# Patient Record
Sex: FEMALE | Race: WHITE | NOT HISPANIC OR LATINO | URBAN - NONMETROPOLITAN AREA
[De-identification: names, ages, dates, MRNs, and addresses within clinical notes are randomized per-mention and may not be internally consistent; named-entity substitution may affect disease eponyms.]

---

## 2023-04-12 ENCOUNTER — OFFICE (OUTPATIENT)
Dept: URBAN - NONMETROPOLITAN AREA CLINIC 5 | Facility: CLINIC | Age: 78
End: 2023-04-12
Payer: COMMERCIAL

## 2023-04-12 VITALS
RESPIRATION RATE: 20 BRPM | DIASTOLIC BLOOD PRESSURE: 98 MMHG | HEART RATE: 93 BPM | SYSTOLIC BLOOD PRESSURE: 122 MMHG | HEIGHT: 65 IN | WEIGHT: 153 LBS

## 2023-04-12 DIAGNOSIS — R19.7 DIARRHEA, UNSPECIFIED: ICD-10-CM

## 2023-04-12 DIAGNOSIS — R11.2 NAUSEA WITH VOMITING, UNSPECIFIED: ICD-10-CM

## 2023-04-12 DIAGNOSIS — R63.4 ABNORMAL WEIGHT LOSS: ICD-10-CM

## 2023-04-12 DIAGNOSIS — R15.2 FECAL URGENCY: ICD-10-CM

## 2023-04-12 PROCEDURE — 99204 OFFICE O/P NEW MOD 45 MIN: CPT | Performed by: INTERNAL MEDICINE

## 2023-04-12 NOTE — SERVICEHPINOTES
Ev Shaffer   is a   78 yo  female  with a past medical history of DM 2, HTN, HLD, and peripheral vascular disease who presents to establish care for diarrhea, nausea, vomiting, and weight loss. Patient reports that at the beginning of February she developed diarrhea suddenly. In February and March she was having diarrhea all day everyday. Now she is having 4-5 episodes of loose watery stools every morning and then is okay the remainder of the day. She does endorse nocturnal BMs, urgency with BMs, episodes of incontinence, and a 10 lb weight loss. She denies any bright red blood per rectum, melena, or mucus in her stools. She has not had a colonoscopy in many years. She denies a family history of colorectal cancer or inflammatory bowel disease. She denies smoking, EtOH use, illicit drug use, or NSAID use. She denies any recent medication changes, travel, or sick contacts. She also endorses infrequent vomiting. She has vomited approximately 3-4 times since the onset of her symptoms at the beginning of February. She last had a significant episode of vomiting last week. She reports it was “severe” but has felt better since then. She does have history of diabetes and her last A1c was 5.9%. She is on Trulicity but has been on this for many years. She does report having stool studies at the beginning of March which were reportedly unremarkable. These are not available for review. She has never had an EGD. She has not had any cross-sectional imaging. She lives in [place].

## 2023-04-12 NOTE — SERVICENOTES
Given patient's persistent diarrhea will plan for stool studies as above.  Will also proceed with colonoscopy as she has not had one in many years.  Will plan to obtain random colon biopsies rule out microscopic colitis.  If all is unremarkable would consider cross-sectional imaging given patient's nausea, vomiting, weight loss, and diarrhea.  In the future could consider assessment of her gallbladder as well.  Also consider EGD with biopsies.

## 2023-04-20 ENCOUNTER — OFFICE (OUTPATIENT)
Dept: URBAN - METROPOLITAN AREA PATHOLOGY 16 | Facility: PATHOLOGY | Age: 78
End: 2023-04-20
Payer: COMMERCIAL

## 2023-04-20 ENCOUNTER — AMBULATORY SURGICAL CENTER (OUTPATIENT)
Dept: URBAN - NONMETROPOLITAN AREA SURGERY 4 | Facility: SURGERY | Age: 78
End: 2023-04-20
Payer: COMMERCIAL

## 2023-04-20 VITALS
DIASTOLIC BLOOD PRESSURE: 84 MMHG | SYSTOLIC BLOOD PRESSURE: 141 MMHG | SYSTOLIC BLOOD PRESSURE: 112 MMHG | RESPIRATION RATE: 12 BRPM | SYSTOLIC BLOOD PRESSURE: 98 MMHG | SYSTOLIC BLOOD PRESSURE: 111 MMHG | SYSTOLIC BLOOD PRESSURE: 141 MMHG | SYSTOLIC BLOOD PRESSURE: 139 MMHG | DIASTOLIC BLOOD PRESSURE: 63 MMHG | HEART RATE: 84 BPM | HEART RATE: 109 BPM | SYSTOLIC BLOOD PRESSURE: 134 MMHG | HEART RATE: 109 BPM | OXYGEN SATURATION: 100 % | DIASTOLIC BLOOD PRESSURE: 62 MMHG | SYSTOLIC BLOOD PRESSURE: 102 MMHG | HEART RATE: 107 BPM | RESPIRATION RATE: 14 BRPM | SYSTOLIC BLOOD PRESSURE: 139 MMHG | HEART RATE: 95 BPM | DIASTOLIC BLOOD PRESSURE: 68 MMHG | DIASTOLIC BLOOD PRESSURE: 84 MMHG | OXYGEN SATURATION: 97 % | DIASTOLIC BLOOD PRESSURE: 61 MMHG | SYSTOLIC BLOOD PRESSURE: 134 MMHG | SYSTOLIC BLOOD PRESSURE: 112 MMHG | TEMPERATURE: 97.4 F | DIASTOLIC BLOOD PRESSURE: 92 MMHG | SYSTOLIC BLOOD PRESSURE: 111 MMHG | HEIGHT: 65 IN | HEART RATE: 90 BPM | RESPIRATION RATE: 17 BRPM | HEART RATE: 84 BPM | RESPIRATION RATE: 10 BRPM | SYSTOLIC BLOOD PRESSURE: 98 MMHG | DIASTOLIC BLOOD PRESSURE: 58 MMHG | SYSTOLIC BLOOD PRESSURE: 142 MMHG | TEMPERATURE: 97.4 F | HEART RATE: 107 BPM | HEART RATE: 88 BPM | WEIGHT: 144 LBS | DIASTOLIC BLOOD PRESSURE: 68 MMHG | HEIGHT: 65 IN | RESPIRATION RATE: 10 BRPM | HEART RATE: 102 BPM | SYSTOLIC BLOOD PRESSURE: 141 MMHG | RESPIRATION RATE: 12 BRPM | DIASTOLIC BLOOD PRESSURE: 82 MMHG | DIASTOLIC BLOOD PRESSURE: 62 MMHG | HEART RATE: 81 BPM | DIASTOLIC BLOOD PRESSURE: 68 MMHG | SYSTOLIC BLOOD PRESSURE: 102 MMHG | HEART RATE: 109 BPM | WEIGHT: 144 LBS | OXYGEN SATURATION: 98 % | TEMPERATURE: 97 F | DIASTOLIC BLOOD PRESSURE: 92 MMHG | DIASTOLIC BLOOD PRESSURE: 82 MMHG | SYSTOLIC BLOOD PRESSURE: 139 MMHG | RESPIRATION RATE: 14 BRPM | TEMPERATURE: 97 F | SYSTOLIC BLOOD PRESSURE: 112 MMHG | HEART RATE: 102 BPM | HEART RATE: 81 BPM | SYSTOLIC BLOOD PRESSURE: 102 MMHG | RESPIRATION RATE: 17 BRPM | SYSTOLIC BLOOD PRESSURE: 98 MMHG | RESPIRATION RATE: 10 BRPM | OXYGEN SATURATION: 100 % | SYSTOLIC BLOOD PRESSURE: 111 MMHG | HEART RATE: 90 BPM | DIASTOLIC BLOOD PRESSURE: 92 MMHG | WEIGHT: 144 LBS | HEART RATE: 102 BPM | HEART RATE: 81 BPM | OXYGEN SATURATION: 100 % | TEMPERATURE: 97 F | RESPIRATION RATE: 14 BRPM | DIASTOLIC BLOOD PRESSURE: 63 MMHG | OXYGEN SATURATION: 98 % | SYSTOLIC BLOOD PRESSURE: 134 MMHG | RESPIRATION RATE: 17 BRPM | DIASTOLIC BLOOD PRESSURE: 62 MMHG | DIASTOLIC BLOOD PRESSURE: 61 MMHG | RESPIRATION RATE: 12 BRPM | DIASTOLIC BLOOD PRESSURE: 84 MMHG | HEART RATE: 88 BPM | DIASTOLIC BLOOD PRESSURE: 58 MMHG | OXYGEN SATURATION: 96 % | SYSTOLIC BLOOD PRESSURE: 142 MMHG | DIASTOLIC BLOOD PRESSURE: 82 MMHG | TEMPERATURE: 97.4 F | HEIGHT: 65 IN | OXYGEN SATURATION: 98 % | OXYGEN SATURATION: 96 % | HEART RATE: 90 BPM | SYSTOLIC BLOOD PRESSURE: 142 MMHG | DIASTOLIC BLOOD PRESSURE: 61 MMHG | DIASTOLIC BLOOD PRESSURE: 63 MMHG | HEART RATE: 95 BPM | OXYGEN SATURATION: 96 % | DIASTOLIC BLOOD PRESSURE: 58 MMHG | HEART RATE: 88 BPM | HEART RATE: 95 BPM | OXYGEN SATURATION: 97 % | OXYGEN SATURATION: 97 % | HEART RATE: 107 BPM | HEART RATE: 84 BPM

## 2023-04-20 DIAGNOSIS — D21.4 BENIGN NEOPLASM OF CONNECTIVE AND OTHER SOFT TISSUE OF ABDOM: ICD-10-CM

## 2023-04-20 DIAGNOSIS — D12.4 BENIGN NEOPLASM OF DESCENDING COLON: ICD-10-CM

## 2023-04-20 DIAGNOSIS — R19.7 DIARRHEA, UNSPECIFIED: ICD-10-CM

## 2023-04-20 DIAGNOSIS — D12.5 BENIGN NEOPLASM OF SIGMOID COLON: ICD-10-CM

## 2023-04-20 DIAGNOSIS — K57.30 DIVERTICULOSIS OF LARGE INTESTINE WITHOUT PERFORATION OR ABS: ICD-10-CM

## 2023-04-20 DIAGNOSIS — R15.2 FECAL URGENCY: ICD-10-CM

## 2023-04-20 DIAGNOSIS — R63.4 ABNORMAL WEIGHT LOSS: ICD-10-CM

## 2023-04-20 PROBLEM — K63.5 POLYP OF COLON: Status: ACTIVE | Noted: 2023-04-20

## 2023-04-20 PROCEDURE — 00811 ANES LWR INTST NDSC NOS: CPT | Mod: QZ | Performed by: NURSE ANESTHETIST, CERTIFIED REGISTERED

## 2023-04-20 PROCEDURE — 88342 IMHCHEM/IMCYTCHM 1ST ANTB: CPT | Mod: 59 | Performed by: PATHOLOGY

## 2023-04-20 PROCEDURE — 45385 COLONOSCOPY W/LESION REMOVAL: CPT | Performed by: INTERNAL MEDICINE

## 2023-04-20 PROCEDURE — 88341 IMHCHEM/IMCYTCHM EA ADD ANTB: CPT | Performed by: PATHOLOGY

## 2023-04-20 PROCEDURE — 45380 COLONOSCOPY AND BIOPSY: CPT | Mod: 59 | Performed by: INTERNAL MEDICINE

## 2023-04-20 PROCEDURE — 88305 TISSUE EXAM BY PATHOLOGIST: CPT | Performed by: PATHOLOGY

## 2023-05-25 ENCOUNTER — OFFICE (OUTPATIENT)
Dept: URBAN - NONMETROPOLITAN AREA CLINIC 5 | Facility: CLINIC | Age: 78
End: 2023-05-25
Payer: COMMERCIAL

## 2023-05-25 VITALS
DIASTOLIC BLOOD PRESSURE: 59 MMHG | WEIGHT: 150 LBS | HEIGHT: 65 IN | HEART RATE: 68 BPM | SYSTOLIC BLOOD PRESSURE: 121 MMHG | RESPIRATION RATE: 18 BRPM

## 2023-05-25 DIAGNOSIS — R19.7 DIARRHEA, UNSPECIFIED: ICD-10-CM

## 2023-05-25 DIAGNOSIS — R15.2 FECAL URGENCY: ICD-10-CM

## 2023-05-25 DIAGNOSIS — K57.30 DIVERTICULOSIS OF LARGE INTESTINE WITHOUT PERFORATION OR ABS: ICD-10-CM

## 2023-05-25 PROCEDURE — 99214 OFFICE O/P EST MOD 30 MIN: CPT | Performed by: INTERNAL MEDICINE

## 2023-05-25 NOTE — SERVICENOTES
Given patient's biopsy showing questionable mass ascitic in her colitis have recommended she add Pepcid 2 Allegra which she already takes every morning.  She reports her symptoms have significantly improved but she does continue with early morning urgency with 2-3 bowel movements but that is fine remainder of the day.  She has been taking 1 Metamucil gummy and I recommended she attempt Benefiber.  Will plan to see back in clinic as needed.

## 2023-05-25 NOTE — SERVICEHPINOTES
Ev Sahffer   is a   78   year old  female   with a past medical history of DM 2, HTN, HLD, and peripheral vascular disease who presents for follow up of diarrhea, nausea, vomiting, and weight loss. Patient at initial visit reported that at the beginning of February she developed diarrhea suddenly. In February and March she was having diarrhea all day everyday. At the time of her visit in April she was having 4-5 episodes of loose watery stools every morning and then was okay the remainder of the day. She did endorse nocturnal BMs, urgency with BMs, episodes of incontinence, and a 10 lb weight loss. She denied any bright red blood per rectum, melena, or mucus in her stools. She had not had a colonoscopy in many years. She denied a family history of colorectal cancer or inflammatory bowel disease. She denied smoking, EtOH use, illicit drug use, or NSAID use. She denied any recent medication changes, travel, or sick contacts. She also endorsed infrequent vomiting. She had vomited approximately 3-4 times since the onset of her symptoms at the beginning of February.  br
brPatient after last visit had stool studies which were negative for any infectious etiology. A subsequent colonoscopy on 04/20/2023 noted normal mucosa in the terminal ileum. There was moderate diverticulosis throughout the entire colon. There were 4 colon polyps removed. Also noted were internal hemorrhoids. Polyps returned as a leiomyoma, tubular adenoma, and sessile serrated adenoma. Counseled patient given age further colonoscopy's were not warranted. Also noted was a variable increase in mast cells throughout the colon. This raised the consideration for possibility of mastocytic enterocolitis. Patient was counseled to start on H1 and H2 blocker with Pepcid and Zyrtec. She presents for follow-up today.      patient reports she is doing well today.  She thinks with a bowel cleanout that everything significantly improved.  She does wake up in the morning have to have a bowel movement around 5:00 a.m..  She does endorse urgency with bowel movements.  She still goes 2-3 times daily.  She denies any mucus in the stools, bright red blood per rectum, or melena.  She does take Allegra every morning.  She never added Pepcid for possible mastocytic enterocolitis.  She is taking 1 fiber gummy daily.  She has never attempted Benefiber.